# Patient Record
Sex: MALE | Race: WHITE | NOT HISPANIC OR LATINO | Employment: OTHER | URBAN - METROPOLITAN AREA
[De-identification: names, ages, dates, MRNs, and addresses within clinical notes are randomized per-mention and may not be internally consistent; named-entity substitution may affect disease eponyms.]

---

## 2019-03-15 ENCOUNTER — HOSPITAL ENCOUNTER (OUTPATIENT)
Dept: OTHER | Facility: HOSPITAL | Age: 69
Setting detail: SPECIMEN
Discharge: HOME | End: 2019-03-15
Attending: INTERNAL MEDICINE | Admitting: INTERNAL MEDICINE

## 2019-07-01 ENCOUNTER — APPOINTMENT (OUTPATIENT)
Dept: ULTRASOUND IMAGING | Facility: HOSPITAL | Age: 69
End: 2019-07-01

## 2019-07-01 ENCOUNTER — APPOINTMENT (OUTPATIENT)
Dept: GENERAL RADIOLOGY | Facility: HOSPITAL | Age: 69
End: 2019-07-01

## 2019-07-01 ENCOUNTER — HOSPITAL ENCOUNTER (OUTPATIENT)
Facility: HOSPITAL | Age: 69
Setting detail: OBSERVATION
Discharge: HOME | End: 2019-07-01
Attending: INTERNAL MEDICINE | Admitting: INTERNAL MEDICINE

## 2019-07-01 VITALS
HEART RATE: 59 BPM | HEIGHT: 71 IN | WEIGHT: 301.59 LBS | SYSTOLIC BLOOD PRESSURE: 140 MMHG | OXYGEN SATURATION: 97 % | RESPIRATION RATE: 19 BRPM | TEMPERATURE: 98.2 F | BODY MASS INDEX: 42.22 KG/M2 | DIASTOLIC BLOOD PRESSURE: 91 MMHG

## 2019-07-01 DIAGNOSIS — R10.11 RIGHT UPPER QUADRANT ABDOMINAL PAIN: ICD-10-CM

## 2019-07-01 DIAGNOSIS — R07.9 CHEST PAIN, UNSPECIFIED TYPE: Primary | ICD-10-CM

## 2019-07-01 DIAGNOSIS — R06.00 DYSPNEA, UNSPECIFIED TYPE: ICD-10-CM

## 2019-07-01 LAB
ALBUMIN SERPL-MCNC: 3.7 G/DL (ref 3.5–4.8)
ALBUMIN/GLOB SERPL: 1.4 G/DL (ref 1–1.7)
ALP SERPL-CCNC: 51 U/L (ref 32–91)
ALT SERPL W P-5'-P-CCNC: 17 U/L (ref 17–63)
ANION GAP SERPL CALCULATED.3IONS-SCNC: 14.4 MMOL/L (ref 10–20)
APTT PPP: 26 SECONDS (ref 24–31)
AST SERPL-CCNC: 15 U/L (ref 15–41)
BASOPHILS # BLD AUTO: 0 10*3/MM3 (ref 0–0.2)
BASOPHILS NFR BLD AUTO: 0.7 % (ref 0–1.5)
BILIRUB SERPL-MCNC: 0.6 MG/DL (ref 0.3–1.2)
BNP SERPL-MCNC: 78 PG/ML
BUN BLD-MCNC: 17 MG/DL (ref 8–20)
BUN/CREAT SERPL: 17 (ref 6.2–20.3)
CALCIUM SPEC-SCNC: 8.9 MG/DL (ref 8.9–10.3)
CHLORIDE SERPL-SCNC: 105 MMOL/L (ref 101–111)
CO2 SERPL-SCNC: 21 MMOL/L (ref 22–32)
CREAT BLD-MCNC: 1 MG/DL (ref 0.7–1.2)
D DIMER PPP FEU-MCNC: 0.39 MCGFEU/ML (ref 0.17–0.59)
DEPRECATED RDW RBC AUTO: 48.6 FL (ref 37–54)
EOSINOPHIL # BLD AUTO: 0.1 10*3/MM3 (ref 0–0.4)
EOSINOPHIL NFR BLD AUTO: 1 % (ref 0.3–6.2)
ERYTHROCYTE [DISTWIDTH] IN BLOOD BY AUTOMATED COUNT: 14 % (ref 12.3–15.4)
GFR SERPL CREATININE-BSD FRML MDRD: 74 ML/MIN/1.73
GLOBULIN UR ELPH-MCNC: 2.6 GM/DL (ref 2.5–3.8)
GLUCOSE BLD-MCNC: 157 MG/DL (ref 65–99)
GLUCOSE BLDC GLUCOMTR-MCNC: 132 MG/DL (ref 70–105)
HBA1C MFR BLD: 6.2 % (ref 3.5–5.6)
HCT VFR BLD AUTO: 44.3 % (ref 37.5–51)
HGB BLD-MCNC: 14.6 G/DL (ref 13–17.7)
INR PPP: 1 (ref 0.9–1.1)
LIPASE SERPL-CCNC: 41 U/L (ref 22–51)
LYMPHOCYTES # BLD AUTO: 1.2 10*3/MM3 (ref 0.7–3.1)
LYMPHOCYTES NFR BLD AUTO: 16.7 % (ref 19.6–45.3)
MCH RBC QN AUTO: 32.7 PG (ref 26.6–33)
MCHC RBC AUTO-ENTMCNC: 33 G/DL (ref 31.5–35.7)
MCV RBC AUTO: 99.2 FL (ref 79–97)
MONOCYTES # BLD AUTO: 0.6 10*3/MM3 (ref 0.1–0.9)
MONOCYTES NFR BLD AUTO: 8.1 % (ref 5–12)
NEUTROPHILS # BLD AUTO: 5.3 10*3/MM3 (ref 1.7–7)
NEUTROPHILS NFR BLD AUTO: 73.5 % (ref 42.7–76)
NRBC BLD AUTO-RTO: 0.1 /100 WBC (ref 0–0.2)
PLATELET # BLD AUTO: 234 10*3/MM3 (ref 140–450)
PMV BLD AUTO: 8.6 FL (ref 6–12)
POTASSIUM BLD-SCNC: 3.4 MMOL/L (ref 3.6–5.1)
PROT SERPL-MCNC: 6.3 G/DL (ref 6.1–7.9)
PROTHROMBIN TIME: 10.3 SECONDS (ref 9.6–11.7)
RBC # BLD AUTO: 4.47 10*6/MM3 (ref 4.14–5.8)
SODIUM BLD-SCNC: 137 MMOL/L (ref 136–144)
TROPONIN I SERPL-MCNC: <0.03 NG/ML (ref 0–0.03)
TROPONIN I SERPL-MCNC: <0.03 NG/ML (ref 0–0.03)
WBC NRBC COR # BLD: 7.2 10*3/MM3 (ref 3.4–10.8)

## 2019-07-01 PROCEDURE — 83036 HEMOGLOBIN GLYCOSYLATED A1C: CPT | Performed by: NURSE PRACTITIONER

## 2019-07-01 PROCEDURE — 94799 UNLISTED PULMONARY SVC/PX: CPT

## 2019-07-01 PROCEDURE — 83690 ASSAY OF LIPASE: CPT | Performed by: NURSE PRACTITIONER

## 2019-07-01 PROCEDURE — 99235 HOSP IP/OBS SAME DATE MOD 70: CPT | Performed by: INTERNAL MEDICINE

## 2019-07-01 PROCEDURE — 85610 PROTHROMBIN TIME: CPT | Performed by: NURSE PRACTITIONER

## 2019-07-01 PROCEDURE — 76705 ECHO EXAM OF ABDOMEN: CPT

## 2019-07-01 PROCEDURE — 83880 ASSAY OF NATRIURETIC PEPTIDE: CPT | Performed by: NURSE PRACTITIONER

## 2019-07-01 PROCEDURE — 84484 ASSAY OF TROPONIN QUANT: CPT | Performed by: NURSE PRACTITIONER

## 2019-07-01 PROCEDURE — 82962 GLUCOSE BLOOD TEST: CPT

## 2019-07-01 PROCEDURE — 85025 COMPLETE CBC W/AUTO DIFF WBC: CPT | Performed by: NURSE PRACTITIONER

## 2019-07-01 PROCEDURE — G0378 HOSPITAL OBSERVATION PER HR: HCPCS

## 2019-07-01 PROCEDURE — 71045 X-RAY EXAM CHEST 1 VIEW: CPT

## 2019-07-01 PROCEDURE — 99284 EMERGENCY DEPT VISIT MOD MDM: CPT

## 2019-07-01 PROCEDURE — 85730 THROMBOPLASTIN TIME PARTIAL: CPT | Performed by: NURSE PRACTITIONER

## 2019-07-01 PROCEDURE — 80053 COMPREHEN METABOLIC PANEL: CPT | Performed by: NURSE PRACTITIONER

## 2019-07-01 PROCEDURE — 85379 FIBRIN DEGRADATION QUANT: CPT | Performed by: NURSE PRACTITIONER

## 2019-07-01 PROCEDURE — 93005 ELECTROCARDIOGRAM TRACING: CPT | Performed by: NURSE PRACTITIONER

## 2019-07-01 RX ORDER — ONDANSETRON 4 MG/1
4 TABLET, FILM COATED ORAL EVERY 6 HOURS PRN
Status: DISCONTINUED | OUTPATIENT
Start: 2019-07-01 | End: 2019-07-01 | Stop reason: HOSPADM

## 2019-07-01 RX ORDER — SODIUM CHLORIDE 0.9 % (FLUSH) 0.9 %
3 SYRINGE (ML) INJECTION EVERY 12 HOURS SCHEDULED
Status: DISCONTINUED | OUTPATIENT
Start: 2019-07-01 | End: 2019-07-01 | Stop reason: HOSPADM

## 2019-07-01 RX ORDER — ATENOLOL 50 MG/1
50 TABLET ORAL DAILY
Status: DISCONTINUED | OUTPATIENT
Start: 2019-07-01 | End: 2019-07-01 | Stop reason: HOSPADM

## 2019-07-01 RX ORDER — POTASSIUM CHLORIDE 20 MEQ/1
10 TABLET, EXTENDED RELEASE ORAL ONCE
Status: COMPLETED | OUTPATIENT
Start: 2019-07-01 | End: 2019-07-01

## 2019-07-01 RX ORDER — LIDOCAINE 50 MG/G
1 PATCH TOPICAL EVERY 24 HOURS
Qty: 6 EACH | Refills: 0 | Status: SHIPPED | OUTPATIENT
Start: 2019-07-01

## 2019-07-01 RX ORDER — SODIUM CHLORIDE 0.9 % (FLUSH) 0.9 %
10 SYRINGE (ML) INJECTION AS NEEDED
Status: DISCONTINUED | OUTPATIENT
Start: 2019-07-01 | End: 2019-07-01 | Stop reason: HOSPADM

## 2019-07-01 RX ORDER — ALLOPURINOL 300 MG/1
300 TABLET ORAL DAILY
Status: DISCONTINUED | OUTPATIENT
Start: 2019-07-01 | End: 2019-07-01 | Stop reason: HOSPADM

## 2019-07-01 RX ORDER — LISINOPRIL 20 MG/1
20 TABLET ORAL DAILY
COMMUNITY

## 2019-07-01 RX ORDER — DEXTROSE MONOHYDRATE 25 G/50ML
25 INJECTION, SOLUTION INTRAVENOUS
Status: DISCONTINUED | OUTPATIENT
Start: 2019-07-01 | End: 2019-07-01 | Stop reason: HOSPADM

## 2019-07-01 RX ORDER — ALLOPURINOL 300 MG/1
300 TABLET ORAL DAILY
COMMUNITY

## 2019-07-01 RX ORDER — ALUMINA, MAGNESIA, AND SIMETHICONE 2400; 2400; 240 MG/30ML; MG/30ML; MG/30ML
15 SUSPENSION ORAL ONCE
Status: COMPLETED | OUTPATIENT
Start: 2019-07-01 | End: 2019-07-01

## 2019-07-01 RX ORDER — ONDANSETRON 2 MG/ML
4 INJECTION INTRAMUSCULAR; INTRAVENOUS EVERY 6 HOURS PRN
Status: DISCONTINUED | OUTPATIENT
Start: 2019-07-01 | End: 2019-07-01 | Stop reason: HOSPADM

## 2019-07-01 RX ORDER — NITROGLYCERIN 0.4 MG/1
0.4 TABLET SUBLINGUAL
Status: DISCONTINUED | OUTPATIENT
Start: 2019-07-01 | End: 2019-07-01 | Stop reason: HOSPADM

## 2019-07-01 RX ORDER — SIMVASTATIN 80 MG
80 TABLET ORAL NIGHTLY
COMMUNITY

## 2019-07-01 RX ORDER — LISINOPRIL 20 MG/1
20 TABLET ORAL DAILY
Status: DISCONTINUED | OUTPATIENT
Start: 2019-07-01 | End: 2019-07-01 | Stop reason: HOSPADM

## 2019-07-01 RX ORDER — INDOMETHACIN 50 MG/1
50 CAPSULE ORAL 3 TIMES DAILY PRN
COMMUNITY
End: 2020-01-21

## 2019-07-01 RX ORDER — ATENOLOL 50 MG/1
50 TABLET ORAL DAILY
COMMUNITY

## 2019-07-01 RX ORDER — SODIUM CHLORIDE 0.9 % (FLUSH) 0.9 %
3-10 SYRINGE (ML) INJECTION AS NEEDED
Status: DISCONTINUED | OUTPATIENT
Start: 2019-07-01 | End: 2019-07-01 | Stop reason: HOSPADM

## 2019-07-01 RX ORDER — PIOGLITAZONEHYDROCHLORIDE 45 MG/1
45 TABLET ORAL DAILY
Status: DISCONTINUED | OUTPATIENT
Start: 2019-07-01 | End: 2019-07-01 | Stop reason: HOSPADM

## 2019-07-01 RX ORDER — ACETAMINOPHEN 325 MG/1
650 TABLET ORAL EVERY 4 HOURS PRN
Status: DISCONTINUED | OUTPATIENT
Start: 2019-07-01 | End: 2019-07-01 | Stop reason: HOSPADM

## 2019-07-01 RX ORDER — NICOTINE POLACRILEX 4 MG
15 LOZENGE BUCCAL
Status: DISCONTINUED | OUTPATIENT
Start: 2019-07-01 | End: 2019-07-01 | Stop reason: HOSPADM

## 2019-07-01 RX ORDER — INDOMETHACIN 25 MG/1
50 CAPSULE ORAL 3 TIMES DAILY PRN
Status: DISCONTINUED | OUTPATIENT
Start: 2019-07-01 | End: 2019-07-01 | Stop reason: HOSPADM

## 2019-07-01 RX ORDER — PIOGLITAZONEHYDROCHLORIDE 45 MG/1
45 TABLET ORAL DAILY
COMMUNITY

## 2019-07-01 RX ORDER — ATORVASTATIN CALCIUM 40 MG/1
40 TABLET, FILM COATED ORAL DAILY
Status: DISCONTINUED | OUTPATIENT
Start: 2019-07-01 | End: 2019-07-01 | Stop reason: HOSPADM

## 2019-07-01 RX ADMIN — LIDOCAINE HYDROCHLORIDE 15 ML: 20 SOLUTION ORAL; TOPICAL at 05:02

## 2019-07-01 RX ADMIN — Medication 3 ML: at 09:47

## 2019-07-01 RX ADMIN — POTASSIUM CHLORIDE 10 MEQ: 20 TABLET, EXTENDED RELEASE ORAL at 05:40

## 2019-07-01 RX ADMIN — ACETAMINOPHEN 650 MG: 325 TABLET, FILM COATED ORAL at 12:14

## 2019-07-01 RX ADMIN — ATENOLOL 50 MG: 50 TABLET ORAL at 09:46

## 2019-07-01 RX ADMIN — ALUMINUM HYDROXIDE, MAGNESIUM HYDROXIDE, AND DIMETHICONE 15 ML: 400; 400; 40 SUSPENSION ORAL at 05:02

## 2019-07-01 NOTE — H&P
[facility] HOSPITALIST     Jj Ballard MD    CHIEF COMPLAINT:     Chest pain    HISTORY OF PRESENT ILLNESS:    69 yo male presented to the emergency room after having right upper quadrant/right shoulder blade pain that started around 1 AM last night.  But that the pain woke him from sleep and was more of a discomfort.  The wife's account when she would massage the area of discomfort it would get better.  However the pain did not subside so he decided to come to the emergency room for further evaluation.  In the ER and EKG was done troponins were checked and then admission was requested for chest pain.  Reports no diaphoresis nausea vomiting associated with the episode.  He is also had no increased shortness of breath is exertion.  No fever chills.      Past Medical History:   Diagnosis Date   • Diabetes mellitus (CMS/HCC)    • Gallstones    • Gout    • Hyperlipidemia    • Hypertension    • Kidney stone      Past Surgical History:   Procedure Laterality Date   • HERNIA REPAIR     • KIDNEY STONE SURGERY       History reviewed. No pertinent family history.  Social History     Tobacco Use   • Smoking status: Never Smoker   • Smokeless tobacco: Never Used   Substance Use Topics   • Alcohol use: Yes     Alcohol/week: 0.6 oz     Types: 1 Shots of liquor per week   • Drug use: No     Medications Prior to Admission   Medication Sig Dispense Refill Last Dose   • allopurinol (ZYLOPRIM) 300 MG tablet Take 300 mg by mouth Daily.      • atenolol (TENORMIN) 50 MG tablet Take 50 mg by mouth Daily.      • indomethacin (INDOCIN) 50 MG capsule Take 50 mg by mouth 3 (Three) Times a Day As Needed for Mild Pain .      • lisinopril (PRINIVIL,ZESTRIL) 20 MG tablet Take 20 mg by mouth Daily.      • metFORMIN (GLUCOPHAGE) 1000 MG tablet Take 1,000 mg by mouth 2 (Two) Times a Day With Meals.      • pioglitazone (ACTOS) 45 MG tablet Take 45 mg by mouth Daily.      • simvastatin (ZOCOR) 80 MG tablet Take 80 mg by  "mouth Every Night.        Allergies:  Patient has no known allergies.      There is no immunization history on file for this patient.        REVIEW OF SYSTEMS:  Please see the above history of present illness for pertinent positives and negatives.  The remainder of the patient's systems have been reviewed and are negative.     Vital Signs  Temp:  [97.9 °F (36.6 °C)-98.1 °F (36.7 °C)] 97.9 °F (36.6 °C)  Heart Rate:  [64-76] 64  Resp:  [14-19] 18  BP: (112-151)/(66-82) 127/76    Flowsheet Rows      First Filed Value   Admission Height  177.8 cm (70\") Documented at 07/01/2019 0343   Admission Weight  138 kg (305 lb 5.4 oz)  (Abnormal)  Documented at 07/01/2019 0343           Physical Exam:  Gen: NAD  HEENT: EOMI, no icterus, PERRL  Neck: No JVD  Heart: RRR, no murmur  Lung: CTA b/l, adequate air movement  ABD: soft, NT, ND, no rebound or guarding  MSK: moves ext spontaneously  Neuro: AO x 3, CN II-XII intact  Psych: no anxiety, no depression  Skin: warm, dry, intact  Extremities:  No edema    Results Review:    I reviewed the patient's new clinical results.  Lab Results (most recent)     Procedure Component Value Units Date/Time    Hemoglobin A1c [[id number]]  (Abnormal) Collected:  07/01/19 0630    Specimen:  Blood Updated:  07/01/19 0828     Hemoglobin A1C 6.2 %     Narrative:       Hemoglobin A1C Reference Range:    <5.7 %        Normal  5.7-6.4 %     Increased risk for diabetes  > 6.4 %        Diabetes       These guidelines have been recommended by the American Diabetic Association for Hgb A1c.      The following 2010 guidelines have been recommended by the American Diabetes Association for Hemoglobin A1c.    HBA1c 5.7-6.4% Increased risk for future diabetes (pre-diabetes)  HBA1c     >6.4% Diabetes    Troponin [[id number]]  (Normal) Collected:  07/01/19 0630    Specimen:  Blood Updated:  07/01/19 0736     Troponin I <0.030 ng/mL     Narrative:       Troponin I Reference Range:    0.00-0.03  Negative.  Repeat testing " in 4-6 hours if clinically indicated.    0.04-0.29  Suspicious for myocardial injury. Serial measurements and clinical  correlation may be necessary to confirm or exclude diagnosis of acute  coronary syndrome.  Repeat testing in 4-6 hours if indicated.     >0.29 Consistent with myocardial injury.  Recommend clinical and laboratory correlation.     Results my be falsely decreased if patient taking Biotin.     POC Glucose Once [[id number]]  (Abnormal) Collected:  07/01/19 0711    Specimen:  Blood Updated:  07/01/19 0712     Glucose 132 mg/dL      Comment: Serial Number: [id number]:  [id number]       Troponin [[id number]]  (Normal) Collected:  07/01/19 0407    Specimen:  Blood Updated:  07/01/19 0447     Troponin I <0.030 ng/mL     Narrative:       Troponin I Reference Range:    0.00-0.03  Negative.  Repeat testing in 4-6 hours if clinically indicated.    0.04-0.29  Suspicious for myocardial injury. Serial measurements and clinical  correlation may be necessary to confirm or exclude diagnosis of acute  coronary syndrome.  Repeat testing in 4-6 hours if indicated.     >0.29 Consistent with myocardial injury.  Recommend clinical and laboratory correlation.     Results my be falsely decreased if patient taking Biotin.     Comprehensive Metabolic Panel [[id number]]  (Abnormal) Collected:  07/01/19 0407    Specimen:  Blood Updated:  07/01/19 0446     Glucose 157 mg/dL      BUN 17 mg/dL      Creatinine 1.00 mg/dL      Sodium 137 mmol/L      Potassium 3.4 mmol/L      Chloride 105 mmol/L      CO2 21.0 mmol/L      Calcium 8.9 mg/dL      Total Protein 6.3 g/dL      Albumin 3.70 g/dL      ALT (SGPT) 17 U/L      AST (SGOT) 15 U/L      Alkaline Phosphatase 51 U/L      Total Bilirubin 0.6 mg/dL      eGFR Non African Amer 74 mL/min/1.73      Globulin 2.6 gm/dL      A/G Ratio 1.4 g/dL      BUN/Creatinine Ratio 17.0     Anion Gap 14.4 mmol/L     Lipase [[id number]]  (Normal) Collected:  07/01/19 0407    Specimen:  Blood Updated:   07/01/19 0446     Lipase 41 U/L     Protime-INR [[id number]]  (Normal) Collected:  07/01/19 0407    Specimen:  Blood Updated:  07/01/19 0443     Protime 10.3 Seconds      INR 1.00    aPTT [[id number]]  (Normal) Collected:  07/01/19 0407    Specimen:  Blood Updated:  07/01/19 0443     PTT 26.0 seconds     D-dimer, Quantitative [[id number]]  (Normal) Collected:  07/01/19 0407    Specimen:  Blood Updated:  07/01/19 0443     D-Dimer, Quantitative 0.39 MCGFEU/mL     Narrative:       Reference Range  --------------------------------------------------------------------     < 0.50   Negative Predictive Value  0.50-0.59   Indeterminate    >= 0.60   Probable VTE             A very low percentage of patients with DVT may yield D-Dimer results   below the cut-off of 0.50 MCGFEU/mL.  This is known to be more   prevalent in patients with distal DVT.             Results of this test should always be interpreted in conjunction with   the patient's medical history, clinical presentation and other   findings.  Clinical diagnosis should not be based on the result of   IN[place]OVANCE D-Dimer alone.    BNP [[id number]]  (Normal) Collected:  07/01/19 0407    Specimen:  Blood Updated:  07/01/19 0443     BNP 78.0 pg/mL      Comment: Results may be falsely decreased if patient taking Biotin.       CBC & Differential [[id number]] Collected:  07/01/19 0407    Specimen:  Blood Updated:  07/01/19 0432    Narrative:       The following orders were created for panel order CBC & Differential.  Procedure                               Abnormality         Status                     ---------                               -----------         ------                     CBC Auto Differential[[id number]]        Abnormal            Final result                 Please view results for these tests on the individual orders.    CBC Auto Differential [[id number]]  (Abnormal) Collected:  07/01/19 0407    Specimen:  Blood Updated:  07/01/19 0432     WBC 7.20 10*3/mm3       RBC 4.47 10*6/mm3      Hemoglobin 14.6 g/dL      Hematocrit 44.3 %      MCV 99.2 fL      MCH 32.7 pg      MCHC 33.0 g/dL      RDW 14.0 %      RDW-SD 48.6 fl      MPV 8.6 fL      Platelets 234 10*3/mm3      Neutrophil % 73.5 %      Lymphocyte % 16.7 %      Monocyte % 8.1 %      Eosinophil % 1.0 %      Basophil % 0.7 %      Neutrophils, Absolute 5.30 10*3/mm3      Lymphocytes, Absolute 1.20 10*3/mm3      Monocytes, Absolute 0.60 10*3/mm3      Eosinophils, Absolute 0.10 10*3/mm3      Basophils, Absolute 0.00 10*3/mm3      nRBC 0.1 /100 WBC           Imaging Results (most recent)     Procedure Component Value Units Date/Time    XR Chest 1 View [[id number]] Resulted:  07/01/19 0515     Updated:  07/01/19 0515        reviewed    ECG/EMG Results (most recent)     Procedure Component Value Units Date/Time    ECG 12 Lead [[id number]] Collected:  07/01/19 0332     Updated:  07/01/19 0420    Narrative:       HEART RATE= 73  bpm  RR Interval= 824  ms  AR Interval= 156  ms  P Horizontal Axis= -24  deg  P Front Axis= 20  deg  QRSD Interval= 98  ms  QT Interval= 407  ms  QRS Axis= -67  deg  T Wave Axis= 3  deg  - ABNORMAL ECG -  Sinus rhythm  Ventricular premature complex  Probable left atrial enlargement  Inferior infarct, old  Consider anterior infarct  Electronically Signed By:   Date and Time of Study: 2019-07-01 03:32:39        reviewed    Assessment/Plan     Chest pain       Atypical right upper quadrant chest pain   -poss GI related vs MSK  -troponins negative ACS ruled out  -EKG as above  -check RUQ US    Type II DM  -chronic  -SSI while here     HTN  -chronic  -c/w home meds     DVT-low risk early ambulation        D/C Summary    Discharge Diagnosis:  Atypical right upper quadrant chest pain   -likely MSK vs possible gallbladder disease   -troponins negative ACS ruled out  -EKG as above  -RUQ US: gallstones and mild wall thickening possible chronic cholecystitis but no acute cholecystitis noted  -outpt General Surgery  follow up     Hospital Course  Patient is a 68 y.o. male presented with atypical right upper quadrant abdominal pain/shoulder pain.  he had troponins checked and all negative thus ACS was ruled out.  Pain was improved with massaging of the area likely relating to musculoskeletal type pain.  Additionally he had a right upper quadrant ultrasound that showed gallstones and some chronic mild wall thickening of the gallbladder.  It was discussed with the patient and he preferred to have general surgery follow-up as an outpatient.  His pain improved and he was stable and discharged home outpatient follow-up.    Past Medical History:     Past Medical History:   Diagnosis Date   • Diabetes mellitus (CMS/HCC)    • Gallstones    • Gout    • Hyperlipidemia    • Hypertension    • Kidney stone        Past Surgical History:     Past Surgical History:   Procedure Laterality Date   • HERNIA REPAIR     • KIDNEY STONE SURGERY         Social History:   Social History     Socioeconomic History   • Marital status:      Spouse name: Not on file   • Number of children: Not on file   • Years of education: Not on file   • Highest education level: Not on file   Tobacco Use   • Smoking status: Never Smoker   • Smokeless tobacco: Never Used   Substance and Sexual Activity   • Alcohol use: Yes     Alcohol/week: 0.6 oz     Types: 1 Shots of liquor per week   • Drug use: No   • Sexual activity: No       Procedures Performed         Consults:   Consults     Date and Time Order Name Status Description    7/1/2019 0515 Hospitalist (on-call MD unless specified) Completed           Condition on Discharge: stable    Discharge Disposition      Discharge Medications     Discharge Medications      ASK your doctor about these medications      Instructions Start Date   allopurinol 300 MG tablet  Commonly known as:  ZYLOPRIM   300 mg, Oral, Daily      atenolol 50 MG tablet  Commonly known as:  TENORMIN   50 mg, Oral, Daily      indomethacin 50 MG  capsule  Commonly known as:  INDOCIN   50 mg, Oral, 3 Times Daily PRN      lisinopril 20 MG tablet  Commonly known as:  PRINIVIL,ZESTRIL   20 mg, Oral, Daily      metFORMIN 1000 MG tablet  Commonly known as:  GLUCOPHAGE   1,000 mg, Oral, 2 Times Daily With Meals      pioglitazone 45 MG tablet  Commonly known as:  ACTOS   45 mg, Oral, Daily      simvastatin 80 MG tablet  Commonly known as:  ZOCOR   80 mg, Oral, Nightly             Discharge Diet: Low-fat low residue    Activity at Discharge: Tolerated    Follow-up Appointments  No future appointments.  [unfilled]    Test Results Pending at Discharge  [unfilled]     Risk for Readmission (LACE) Score: 1 (7/1/2019  6:28 AM)          George Fishman DO  07/01/19  8:40 AM

## 2019-07-01 NOTE — ED PROVIDER NOTES
Subjective   Context: 68-year-old male patient presents the ER with complaint of being awakened with pain to the right upper quadrant and right chest; patient reports that he did not have any shortness of breath with this pain, he states that it radiates underneath his right shoulder.  He reports that is diagnosed with gallstones in the past and reports that he was instructed to watch out for further symptoms.  He denies anginal equivalent chest pain tachycardia irregular heartbeat, reports no fainting or near fainting episode.  He reports that he has chronic edema to his legs, left greater than right, he reports that he had multiple evaluations for DVTs that have all been negative.  He reports that recently for the past week he has developed some tenderness behind his right knee, he reports he has noticed no increased edema.  Denies activity intolerance.  The patient reports he has had no appetite or food intolerance but states that he has noticed the symptoms increased recently, he states that he is in a vehicle a lot for work.      Location:   RUQ R chest  Quality: Pressure dull  Timin  Duration: Waxes and wanes  Aggravating: Unable to state  Alleviating: Continuously improved    PCP:  Elio            Review of Systems   Constitutional: Negative for activity change, appetite change, chills, diaphoresis and fever.   HENT: Negative for congestion.    Eyes: Negative for discharge.   Respiratory: Negative for cough, choking, chest tightness and shortness of breath.    Cardiovascular: Positive for leg swelling. Negative for chest pain and palpitations.        R side CP    Chronic leg swelling   Gastrointestinal: Positive for abdominal pain and constipation. Negative for diarrhea, nausea and vomiting.        RUQ   Genitourinary: Negative for dysuria.   Musculoskeletal: Negative for back pain, joint swelling and myalgias.   Skin: Negative for color change, pallor, rash and wound.   Neurological: Negative for  dizziness, light-headedness, numbness and headaches.   Hematological: Negative for adenopathy. Does not bruise/bleed easily.     Prior to Admission medications    Medication Sig Start Date End Date Taking? Authorizing Provider   allopurinol (ZYLOPRIM) 300 MG tablet Take 300 mg by mouth Daily.   Yes Angelica Frye MD   atenolol (TENORMIN) 50 MG tablet Take 50 mg by mouth Daily.   Yes Angelica Frye MD   indomethacin (INDOCIN) 50 MG capsule Take 50 mg by mouth 3 (Three) Times a Day As Needed for Mild Pain .   Yes Angelica Frye MD   lisinopril (PRINIVIL,ZESTRIL) 20 MG tablet Take 20 mg by mouth Daily.   Yes Angelica Frye MD   metFORMIN (GLUCOPHAGE) 1000 MG tablet Take 1,000 mg by mouth 2 (Two) Times a Day With Meals.   Yes Angelica Frye MD   pioglitazone (ACTOS) 45 MG tablet Take 45 mg by mouth Daily.   Yes Angelica Frye MD   simvastatin (ZOCOR) 80 MG tablet Take 80 mg by mouth Every Night.   Yes Angelica Frye MD         Past Medical History:   Diagnosis Date   • Diabetes mellitus (CMS/HCC)    • Gallstones    • Gout    • Hyperlipidemia    • Hypertension    • Kidney stone        No Known Allergies    Past Surgical History:   Procedure Laterality Date   • HERNIA REPAIR     • KIDNEY STONE SURGERY         History reviewed. No pertinent family history.    Social History     Socioeconomic History   • Marital status:      Spouse name: Not on file   • Number of children: Not on file   • Years of education: Not on file   • Highest education level: Not on file   Tobacco Use   • Smoking status: Never Smoker   Substance and Sexual Activity   • Alcohol use: Yes     Alcohol/week: 0.6 oz     Types: 1 Shots of liquor per week           Objective   Physical Exam       Vital signs and triage nurse note reviewed.   Constitutional: Awake, alert; but healthy-appearing 68-year-old male who is well-developed and well-nourished. No acute distress is noted.  Pleasant conversational  examination, appearance is nontoxic   HEENT: Normocephalic, atraumatic; pupils are PERRL with intact EOM; oropharynx is pink and moist without exudate or erythema.   Neck: Supple, full range of motion without pain; no cervical lymphadenopathy.  No erythema or induration of soft tissues of the neck   Cardiovascular: Regular rate and rhythm, normal S1-S2.  Pulses symmetrical extremities   Pulmonary: Respiratory effort regular nonlabored, reproducible tenderness with palpation of the chest wall, breath sounds clear to auscultation all fields.   Abdomen: Soft, obese, minimal tenderness is noted with palpation of the right upper quadrant, no organomegaly, nondistended with normoactive bowel sounds; no rebound or guarding.   Musculoskeletal: Independent range of motion of all extremities with no palpable tenderness or edema.   Neuro: Alert oriented x3, speech is clear and appropriate, GCS 15   Skin:  Fleshtone warm, dry, intact; no erythematous or petechial rash or lesion  Procedures         ECG 12 Lead   Preliminary Result   HEART RATE= 73  bpm   RR Interval= 824  ms   AL Interval= 156  ms   P Horizontal Axis= -24  deg   P Front Axis= 20  deg   QRSD Interval= 98  ms   QT Interval= 407  ms   QRS Axis= -67  deg   T Wave Axis= 3  deg   - ABNORMAL ECG -   Sinus rhythm   Ventricular premature complex   Probable left atrial enlargement   Inferior infarct, old   Consider anterior infarct   Electronically Signed By:    Date and Time of Study: 2019-07-01 03:32:39        Viewed by me, viewed and interpreted by Dr Fishman: No acute ST segment elevation, as above    ED Course        No radiology results for the last day  Results for orders placed or performed during the hospital encounter of 07/01/19   Comprehensive Metabolic Panel   Result Value Ref Range    Glucose 157 (H) 65 - 99 mg/dL    BUN 17 8 - 20 mg/dL    Creatinine 1.00 0.70 - 1.20 mg/dL    Sodium 137 136 - 144 mmol/L    Potassium 3.4 (L) 3.6 - 5.1 mmol/L    Chloride 105  101 - 111 mmol/L    CO2 21.0 (L) 22.0 - 32.0 mmol/L    Calcium 8.9 8.9 - 10.3 mg/dL    Total Protein 6.3 6.1 - 7.9 g/dL    Albumin 3.70 3.50 - 4.80 g/dL    ALT (SGPT) 17 17 - 63 U/L    AST (SGOT) 15 15 - 41 U/L    Alkaline Phosphatase 51 32 - 91 U/L    Total Bilirubin 0.6 0.3 - 1.2 mg/dL    eGFR Non African Amer 74 >60 mL/min/1.73    Globulin 2.6 2.5 - 3.8 gm/dL    A/G Ratio 1.4 1.0 - 1.7 g/dL    BUN/Creatinine Ratio 17.0 6.2 - 20.3    Anion Gap 14.4 10.0 - 20.0 mmol/L   Protime-INR   Result Value Ref Range    Protime 10.3 9.6 - 11.7 Seconds    INR 1.00 0.90 - 1.10   aPTT   Result Value Ref Range    PTT 26.0 24.0 - 31.0 seconds   Lipase   Result Value Ref Range    Lipase 41 22 - 51 U/L   D-dimer, Quantitative   Result Value Ref Range    D-Dimer, Quantitative 0.39 0.17 - 0.59 MCGFEU/mL   BNP   Result Value Ref Range    BNP 78.0 <=100.0 pg/mL   Troponin   Result Value Ref Range    Troponin I <0.030 0.000 - 0.030 ng/mL   CBC Auto Differential   Result Value Ref Range    WBC 7.20 3.40 - 10.80 10*3/mm3    RBC 4.47 4.14 - 5.80 10*6/mm3    Hemoglobin 14.6 13.0 - 17.7 g/dL    Hematocrit 44.3 37.5 - 51.0 %    MCV 99.2 (H) 79.0 - 97.0 fL    MCH 32.7 26.6 - 33.0 pg    MCHC 33.0 31.5 - 35.7 g/dL    RDW 14.0 12.3 - 15.4 %    RDW-SD 48.6 37.0 - 54.0 fl    MPV 8.6 6.0 - 12.0 fL    Platelets 234 140 - 450 10*3/mm3    Neutrophil % 73.5 42.7 - 76.0 %    Lymphocyte % 16.7 (L) 19.6 - 45.3 %    Monocyte % 8.1 5.0 - 12.0 %    Eosinophil % 1.0 0.3 - 6.2 %    Basophil % 0.7 0.0 - 1.5 %    Neutrophils, Absolute 5.30 1.70 - 7.00 10*3/mm3    Lymphocytes, Absolute 1.20 0.70 - 3.10 10*3/mm3    Monocytes, Absolute 0.60 0.10 - 0.90 10*3/mm3    Eosinophils, Absolute 0.10 0.00 - 0.40 10*3/mm3    Basophils, Absolute 0.00 0.00 - 0.20 10*3/mm3    nRBC 0.1 0.0 - 0.2 /100 WBC     Medications   sodium chloride 0.9 % flush 10 mL (not administered)   potassium chloride (K-DUR,KLOR-CON) CR tablet 10 mEq (not administered)   aluminum-magnesium  "hydroxide-simethicone (MAALOX MAX) 400-400-40 MG/5ML suspension 15 mL (15 mL Oral Given 7/1/19 0502)   lidocaine viscous (XYLOCAINE) 2 % mouth solution 15 mL (15 mL Mouth/Throat Given 7/1/19 0502)     /66 (BP Location: Left arm, Patient Position: Lying)   Pulse 76   Temp 98.1 °F (36.7 °C) (Oral)   Resp 19   Ht 177.8 cm (70\")   Wt (!) 138 kg (305 lb 5.4 oz)   SpO2 98%   BMI 43.81 kg/m²           MDM    Comorbidities:   HTN T2DM    Previous records reviewed: No pertinent to this visit for review    Review and summarization of lab specimens, radiology: Chest x-rays labs specimens reviewed by me; x-ray interpreted by Dr. Fishman    Consultation: Hospitalist for admission: Alba ISAACS for Dr Flaherty    Differentials: MI, NSTEMI, ACS, CHF, pulmonary embolism, DVT, pneumonia bronchitis cholecystitis cholangitis hepatitis pancreatitis ileus; this list is not all inclusive and does not constitute the entirety of considered causes      Examination the patient reports that he has no increase in discomfort; differential diagnosis reviewed, at this time recommendations made for admission to the hospital for further evaluation of his symptoms with serial EKGs, troponins and further evaluation of his right upper quadrant abdominal discomfort, the patient was understanding and agrees.  He was given K-Dur in the ED for slight decrease in serum potassium.  Reports he has a history of thin blood, aspirin will be withheld until further evaluation.  He states that he had some improvement with GI cocktail.  Patient admitted in the hospital service in stable condition to 23 hr medical monitor bed for further evaluation and treatment    Final diagnoses:   Chest pain, unspecified type   Dyspnea, unspecified type   Right upper quadrant abdominal pain            Yesica Clemens NP  07/01/19 0533    "

## 2019-07-01 NOTE — PROGRESS NOTES
Case Management Discharge Note    Final Note: Routine d/c to home - Denies d/c needs              Final Discharge Disposition Code: 01 - home or self-care

## 2019-07-01 NOTE — PROGRESS NOTES
Discharge Planning Assessment  [facility]     Patient Name: Jr Mayer  MRN: [id number]  Today's Date: 7/1/2019    Admit Date: 7/1/2019    Discharge Needs Assessment     Row Name 07/01/19 1322       Living Environment    Lives With  spouse    Current Living Arrangements  home/apartment/condo    Primary Care Provided by  self    Able to Return to Prior Arrangements  yes       Resource/Environmental Concerns    Transportation Concerns  car, none       Transition Planning    Patient/Family Anticipates Transition to  home    Patient/Family Anticipated Services at Transition  none    Transportation Anticipated  family or friend will provide       Discharge Needs Assessment    Concerns to be Addressed  no discharge needs identified    Equipment Currently Used at Home  none    Anticipated Changes Related to Illness  none    Equipment Needed After Discharge  none        Discharge Plan     Row Name 07/01/19 1324       Plan    Final Discharge Disposition Code  01 - home or self-care    Final Note  Routine d/c to home - Denies d/c needs            Gianna Don RN

## 2019-07-02 ENCOUNTER — READMISSION MANAGEMENT (OUTPATIENT)
Dept: CALL CENTER | Facility: HOSPITAL | Age: 69
End: 2019-07-02

## 2019-07-02 NOTE — OUTREACH NOTE
Prep Survey      Responses   Facility patient discharged from?  [facility]   Is patient eligible?  No   What are the reasons patient is not eligible?  Other [9 hour stay  [facility]]   Does the patient have one of the following disease processes/diagnoses(primary or secondary)?  Other   Prep survey completed?  Yes          Milena Shen RN

## 2020-01-21 ENCOUNTER — APPOINTMENT (OUTPATIENT)
Dept: CARDIOLOGY | Facility: HOSPITAL | Age: 70
End: 2020-01-21

## 2020-01-21 ENCOUNTER — APPOINTMENT (OUTPATIENT)
Dept: GENERAL RADIOLOGY | Facility: HOSPITAL | Age: 70
End: 2020-01-21

## 2020-01-21 ENCOUNTER — HOSPITAL ENCOUNTER (EMERGENCY)
Facility: HOSPITAL | Age: 70
Discharge: HOME | End: 2020-01-21
Admitting: EMERGENCY MEDICINE

## 2020-01-21 VITALS
BODY MASS INDEX: 42.01 KG/M2 | SYSTOLIC BLOOD PRESSURE: 131 MMHG | DIASTOLIC BLOOD PRESSURE: 83 MMHG | HEIGHT: 71 IN | WEIGHT: 300.05 LBS | RESPIRATION RATE: 18 BRPM | OXYGEN SATURATION: 95 % | TEMPERATURE: 97.5 F | HEART RATE: 55 BPM

## 2020-01-21 DIAGNOSIS — M25.561 RIGHT KNEE PAIN, UNSPECIFIED CHRONICITY: Primary | ICD-10-CM

## 2020-01-21 LAB
BH CV LOWER VASCULAR LEFT COMMON FEMORAL AUGMENT: NORMAL
BH CV LOWER VASCULAR LEFT COMMON FEMORAL COMPETENT: NORMAL
BH CV LOWER VASCULAR LEFT COMMON FEMORAL COMPRESS: NORMAL
BH CV LOWER VASCULAR LEFT COMMON FEMORAL PHASIC: NORMAL
BH CV LOWER VASCULAR LEFT COMMON FEMORAL SPONT: NORMAL
BH CV LOWER VASCULAR RIGHT COMMON FEMORAL AUGMENT: NORMAL
BH CV LOWER VASCULAR RIGHT COMMON FEMORAL COMPETENT: NORMAL
BH CV LOWER VASCULAR RIGHT COMMON FEMORAL COMPRESS: NORMAL
BH CV LOWER VASCULAR RIGHT COMMON FEMORAL PHASIC: NORMAL
BH CV LOWER VASCULAR RIGHT COMMON FEMORAL SPONT: NORMAL
BH CV LOWER VASCULAR RIGHT DISTAL FEMORAL COMPRESS: NORMAL
BH CV LOWER VASCULAR RIGHT GASTRONEMIUS COMPRESS: NORMAL
BH CV LOWER VASCULAR RIGHT GREATER SAPH AK COMPRESS: NORMAL
BH CV LOWER VASCULAR RIGHT GREATER SAPH BK COMPRESS: NORMAL
BH CV LOWER VASCULAR RIGHT LESSER SAPH COMPRESS: NORMAL
BH CV LOWER VASCULAR RIGHT MID FEMORAL AUGMENT: NORMAL
BH CV LOWER VASCULAR RIGHT MID FEMORAL COMPETENT: NORMAL
BH CV LOWER VASCULAR RIGHT MID FEMORAL COMPRESS: NORMAL
BH CV LOWER VASCULAR RIGHT MID FEMORAL PHASIC: NORMAL
BH CV LOWER VASCULAR RIGHT MID FEMORAL SPONT: NORMAL
BH CV LOWER VASCULAR RIGHT PERONEAL COMPRESS: NORMAL
BH CV LOWER VASCULAR RIGHT POPLITEAL AUGMENT: NORMAL
BH CV LOWER VASCULAR RIGHT POPLITEAL COMPETENT: NORMAL
BH CV LOWER VASCULAR RIGHT POPLITEAL COMPRESS: NORMAL
BH CV LOWER VASCULAR RIGHT POPLITEAL PHASIC: NORMAL
BH CV LOWER VASCULAR RIGHT POPLITEAL SPONT: NORMAL
BH CV LOWER VASCULAR RIGHT POSTERIOR TIBIAL COMPRESS: NORMAL
BH CV LOWER VASCULAR RIGHT PROXIMAL FEMORAL COMPRESS: NORMAL
BH CV LOWER VASCULAR RIGHT SAPHENOFEMORAL JUNCTION AUGMENT: NORMAL
BH CV LOWER VASCULAR RIGHT SAPHENOFEMORAL JUNCTION COMPETENT: NORMAL
BH CV LOWER VASCULAR RIGHT SAPHENOFEMORAL JUNCTION COMPRESS: NORMAL
BH CV LOWER VASCULAR RIGHT SAPHENOFEMORAL JUNCTION PHASIC: NORMAL
BH CV LOWER VASCULAR RIGHT SAPHENOFEMORAL JUNCTION SPONT: NORMAL

## 2020-01-21 PROCEDURE — 73564 X-RAY EXAM KNEE 4 OR MORE: CPT

## 2020-01-21 PROCEDURE — 99282 EMERGENCY DEPT VISIT SF MDM: CPT

## 2020-01-21 PROCEDURE — 93971 EXTREMITY STUDY: CPT

## 2020-01-21 RX ORDER — DICLOFENAC SODIUM 75 MG/1
75 TABLET, DELAYED RELEASE ORAL 2 TIMES DAILY PRN
Qty: 20 TABLET | Refills: 0 | Status: SHIPPED | OUTPATIENT
Start: 2020-01-21

## 2020-01-29 ENCOUNTER — OFFICE VISIT (OUTPATIENT)
Dept: ORTHOPEDIC SURGERY | Facility: CLINIC | Age: 70
End: 2020-01-29

## 2020-01-29 VITALS
BODY MASS INDEX: 42.42 KG/M2 | SYSTOLIC BLOOD PRESSURE: 154 MMHG | HEART RATE: 72 BPM | HEIGHT: 71 IN | WEIGHT: 303 LBS | DIASTOLIC BLOOD PRESSURE: 101 MMHG

## 2020-01-29 DIAGNOSIS — M17.0 BILATERAL PRIMARY OSTEOARTHRITIS OF KNEE: Primary | ICD-10-CM

## 2020-01-29 PROCEDURE — 99203 OFFICE O/P NEW LOW 30 MIN: CPT | Performed by: ORTHOPAEDIC SURGERY

## 2020-01-29 RX ORDER — INDOMETHACIN 50 MG/1
50 CAPSULE ORAL 3 TIMES DAILY PRN
COMMUNITY

## 2020-01-29 NOTE — PROGRESS NOTES
"     Patient ID: Jr Mayer is a 69 y.o. male.    Chief Complaint:    Chief Complaint   Patient presents with   • Right Knee - Consult       HPI:  Jr is a 69-year-old gentleman here with bilateral right greater than left knee pain.  Symptoms have gradually worsened over the last couple years.  About a month ago he had increasing pain over the back of the knee and went to the emergency room where ultrasound was negative for clot.  With some ibuprofen the pain in the back of the knee is resolved but he has activity related knee pain in both knees mainly in the front of the knee that is worse after prolonged walking.  Pain is sharp and a 6/10.  He has a history of GI bleeding so likes to avoid oral anti-inflammatories.  Past Medical History:   Diagnosis Date   • Diabetes mellitus (CMS/HCC)    • Gallstones    • Gout    • Hyperlipidemia    • Hypertension    • Kidney stone        Past Surgical History:   Procedure Laterality Date   • CHOLECYSTECTOMY  08/2019   • HERNIA REPAIR     • KIDNEY STONE SURGERY         History reviewed. No pertinent family history.       Social History     Occupational History   • Not on file   Tobacco Use   • Smoking status: Never Smoker   • Smokeless tobacco: Never Used   Substance and Sexual Activity   • Alcohol use: Yes     Alcohol/week: 1.0 standard drinks     Types: 1 Shots of liquor per week   • Drug use: No   • Sexual activity: Never      Review of Systems   Cardiovascular: Negative for chest pain.   Musculoskeletal: Positive for arthralgias.       Objective:    BP (!) 154/101   Pulse 72   Ht 180.3 cm (71\")   Wt (!) 137 kg (303 lb)   BMI 42.26 kg/m²     Physical Examination:  He is a pleasant male in no distress. He is alert and oriented x3 and appears his stated age.  Both knees demonstrate no scars and no atrophy with a mild effusion on the right and none on the left.  He has medial joint line tenderness on both knees with range of motion 0 to 150 degrees and no instability " and a positive medial Breanne.Sensory and motor exam are intact all distributions. Dorsalis pedis and posterior tibialis pulses are palpable and capillary refill is less than two seconds to all digits    Imaging:  X-rays demonstrate moderate degenerative joint disease    Assessment:  Moderate degenerative joint disease both knees    Plan:  Treatment options discussed, we will begin with Visco supplementation.  Possibility of additional steroid injections were discussed as well.          Disclaimer: Please note that areas of this note were completed with computer voice recognition software.  Quite often unanticipated grammatical, syntax, homophones, and other interpretive errors are inadvertently transcribed by the computer software. Please excuse any errors that have escaped final proofreading.

## 2020-03-10 ENCOUNTER — TELEPHONE (OUTPATIENT)
Dept: ORTHOPEDIC SURGERY | Facility: CLINIC | Age: 70
End: 2020-03-10

## 2020-03-19 ENCOUNTER — OFFICE VISIT (OUTPATIENT)
Dept: ORTHOPEDIC SURGERY | Facility: CLINIC | Age: 70
End: 2020-03-19

## 2020-03-19 VITALS
HEIGHT: 71 IN | SYSTOLIC BLOOD PRESSURE: 145 MMHG | DIASTOLIC BLOOD PRESSURE: 87 MMHG | TEMPERATURE: 98.2 F | WEIGHT: 303 LBS | BODY MASS INDEX: 42.42 KG/M2 | HEART RATE: 81 BPM

## 2020-03-19 DIAGNOSIS — M17.0 BILATERAL PRIMARY OSTEOARTHRITIS OF KNEE: Primary | ICD-10-CM

## 2020-03-19 PROCEDURE — 20610 DRAIN/INJ JOINT/BURSA W/O US: CPT | Performed by: ORTHOPAEDIC SURGERY

## 2020-03-19 NOTE — PROGRESS NOTES
"     Patient ID: Jr Mayer is a 69 y.o. male.    Bilateral knee pain, here for Visco 1    Objective:    /87   Pulse 81   Temp 98.2 °F (36.8 °C)   Ht 180.3 cm (71\")   Wt (!) 137 kg (303 lb)   BMI 42.26 kg/m²     Physical Examination:  Both knees demonstrate no redness trace effusion      Imaging:      Assessment:  Bilateral knee degenerative joint disease    Plan:  Risks and benefits of the injection were discussed. Under sterile technique and written consent I injected one syringe of Orthovisc into each knee. It was well tolerated. Postinjection instructions were given  "

## 2020-03-26 ENCOUNTER — OFFICE VISIT (OUTPATIENT)
Dept: ORTHOPEDIC SURGERY | Facility: CLINIC | Age: 70
End: 2020-03-26

## 2020-03-26 VITALS
WEIGHT: 303 LBS | TEMPERATURE: 98.1 F | HEART RATE: 79 BPM | SYSTOLIC BLOOD PRESSURE: 134 MMHG | DIASTOLIC BLOOD PRESSURE: 80 MMHG | BODY MASS INDEX: 42.42 KG/M2 | HEIGHT: 71 IN

## 2020-03-26 DIAGNOSIS — M17.0 BILATERAL PRIMARY OSTEOARTHRITIS OF KNEE: Primary | ICD-10-CM

## 2020-03-26 PROCEDURE — 20610 DRAIN/INJ JOINT/BURSA W/O US: CPT | Performed by: ORTHOPAEDIC SURGERY

## 2020-03-26 NOTE — PROGRESS NOTES
Patient ID: Jr Mayer is a 69 y.o. male.    Bilateral knee pain  Here for Visco 2    Objective:    There were no vitals taken for this visit.    Physical Examination:  Both knees demonstrate no redness mild effusions      Imaging:      Assessment:  Bilateral knee degenerative joint disease    Plan:  Risks and benefits of the injection were discussed. Under sterile technique and written consent I injected one syringe of Orthovisc into each knee. It was well tolerated. Postinjection instructions were given

## 2020-03-27 ENCOUNTER — TELEPHONE (OUTPATIENT)
Dept: ORTHOPEDIC SURGERY | Facility: CLINIC | Age: 70
End: 2020-03-27

## 2020-03-30 ENCOUNTER — OFFICE VISIT (OUTPATIENT)
Dept: ORTHOPEDIC SURGERY | Facility: CLINIC | Age: 70
End: 2020-03-30

## 2020-03-30 VITALS
WEIGHT: 303 LBS | DIASTOLIC BLOOD PRESSURE: 83 MMHG | HEART RATE: 98 BPM | HEIGHT: 71 IN | BODY MASS INDEX: 42.42 KG/M2 | SYSTOLIC BLOOD PRESSURE: 131 MMHG

## 2020-03-30 DIAGNOSIS — M17.0 BILATERAL PRIMARY OSTEOARTHRITIS OF KNEE: Primary | ICD-10-CM

## 2020-03-30 PROCEDURE — 20610 DRAIN/INJ JOINT/BURSA W/O US: CPT | Performed by: ORTHOPAEDIC SURGERY

## 2020-03-30 NOTE — PROGRESS NOTES
"     Patient ID: Jr Mayer is a 69 y.o. male.  Bilateral knee pain  Here for Visco 3      Objective:    There were no vitals taken for this visit.    Physical Examination:  Both knees demonstrate no redness trace effusions      Imaging:      Assessment:  Bilateral knee degenerative joint disease  Plan:  Risks and benefits of the injection were discussed. Under sterile technique and written consent I injected one syringe of Orthovisc into each knee. It was well tolerated. Postinjection instructions were given     Patient ID: Jr Mayer is a 69 y.o. male.        Objective:    /83   Pulse 98   Ht 180.3 cm (71\")   Wt (!) 137 kg (303 lb)   BMI 42.26 kg/m²     Physical Examination:        Imaging:      Assessment:      Plan:    "

## 2023-09-25 ENCOUNTER — OFFICE VISIT (OUTPATIENT)
Dept: ORTHOPEDIC SURGERY | Facility: CLINIC | Age: 73
End: 2023-09-25

## 2023-09-25 VITALS — HEIGHT: 71 IN | BODY MASS INDEX: 42.95 KG/M2 | WEIGHT: 306.8 LBS | HEART RATE: 87 BPM

## 2023-09-25 DIAGNOSIS — M17.0 BILATERAL PRIMARY OSTEOARTHRITIS OF KNEE: Primary | ICD-10-CM

## 2023-09-25 PROCEDURE — 99203 OFFICE O/P NEW LOW 30 MIN: CPT | Performed by: ORTHOPAEDIC SURGERY

## 2023-09-25 RX ORDER — HYDROCODONE BITARTRATE AND ACETAMINOPHEN 7.5; 325 MG/1; MG/1
TABLET ORAL
COMMUNITY
Start: 2023-07-11

## 2023-09-25 RX ORDER — TAMSULOSIN HYDROCHLORIDE 0.4 MG/1
CAPSULE ORAL
COMMUNITY

## 2023-09-25 NOTE — PROGRESS NOTES
"     Patient ID: Jr Mayer is a 72 y.o. male.    Chief Complaint:    Chief Complaint   Patient presents with    Right Knee - Initial Evaluation     Pain 3-4    Left Knee - Initial Evaluation, Pain     Pain 5-6        HPI:  This is a 72-year-old gentleman here with longstanding bilateral knee pain secondary to arthritis he has symptoms that are mainly worse in the morning and with prolonged walking.  Actually feels a little bit better during the day while walking he takes Tylenol and occasional hydrocodone he had viscosupplementation with good results about 3 years ago  Past Medical History:   Diagnosis Date    Diabetes mellitus     Gallstones     Gout     Hyperlipidemia     Hypertension     Kidney stone        Past Surgical History:   Procedure Laterality Date    CHOLECYSTECTOMY  08/2019    HERNIA REPAIR      KIDNEY STONE SURGERY         History reviewed. No pertinent family history.       Social History     Occupational History    Not on file   Tobacco Use    Smoking status: Never    Smokeless tobacco: Never   Vaping Use    Vaping Use: Never used   Substance and Sexual Activity    Alcohol use: Yes     Alcohol/week: 1.0 standard drink     Types: 1 Shots of liquor per week    Drug use: No    Sexual activity: Never      Review of Systems   Cardiovascular:  Negative for chest pain.   Musculoskeletal:  Positive for arthralgias.     Objective:    Pulse 87   Ht 180.3 cm (71\")   Wt (!) 139 kg (306 lb 12.8 oz)   BMI 42.79 kg/m²     Physical Examination:    Both knees demonstrate intact skin there is a moderate effusion on the left mild effusion on the right no redness or warmth mild varus alignment with mild to moderate medial joint line tenderness knee range of motion 2 to 110 degrees bilateral  Sensory and motor exam are intact in all distributions. Dorsalis pedis and posterior tibialis pulses are palpable and capillary refill is less than two seconds to all digits.  Imaging:  Recent x-rays are reviewed " demonstrate end-stage degenerative joint disease both knees    Assessment:  Bilateral knee degenerative joint disease    Plan:  Options discussed he would like to continue conservative treatment I recommend repeat viscosupplementation      Procedures         Disclaimer: Part of this note may be an electronic transcription/translation of spoken language to printed text using the Dragon Dictation System

## 2023-09-27 ENCOUNTER — PATIENT ROUNDING (BHMG ONLY) (OUTPATIENT)
Dept: ORTHOPEDIC SURGERY | Facility: CLINIC | Age: 73
End: 2023-09-27
Payer: MEDICARE

## 2023-09-27 NOTE — PROGRESS NOTES
A My-Chart message has been sent to the patient for PATIENT ROUNDING with [facility]

## 2023-10-19 ENCOUNTER — OFFICE VISIT (OUTPATIENT)
Dept: ORTHOPEDIC SURGERY | Facility: CLINIC | Age: 73
End: 2023-10-19
Payer: MEDICARE

## 2023-10-19 VITALS — HEIGHT: 71 IN | WEIGHT: 306 LBS | BODY MASS INDEX: 42.84 KG/M2 | HEART RATE: 86 BPM

## 2023-10-19 DIAGNOSIS — M17.0 BILATERAL PRIMARY OSTEOARTHRITIS OF KNEE: Primary | ICD-10-CM

## 2023-10-19 NOTE — PROGRESS NOTES
Patient ID: Jr Mayer is a 72 y.o. male.    Bilateral knee pain, here for Visco  Review of Systems:        Objective:    There were no vitals taken for this visit.    Physical Examination:   Both knees demonstrate intact skin there is a moderate effusion on the left mild effusion on the right no redness or warmth mild varus alignment with mild to moderate medial joint line tenderness knee range of motion 2 to 110 degrees bilateral        Imaging:       Assessment:    Bilateral knee degenerative joint disease     Plan:         Large Joint Arthrocentesis: R knee  Date/Time: 10/19/2023 10:36 AM  Consent given by: patient  Timeout: Immediately prior to procedure a time out was called to verify the correct patient, procedure, equipment, support staff and site/side marked as required   Supporting Documentation  Indications: pain   Procedure Details  Location: knee - R knee  Preparation: Patient was prepped and draped in the usual sterile fashion  Needle size: 22 G  Medications administered: 88 mg Hyaluronan 88 MG/4ML  Patient tolerance: patient tolerated the procedure well with no immediate complications      Large Joint Arthrocentesis: L knee  Date/Time: 10/19/2023 10:36 AM  Consent given by: patient  Timeout: Immediately prior to procedure a time out was called to verify the correct patient, procedure, equipment, support staff and site/side marked as required   Supporting Documentation  Indications: pain   Procedure Details  Location: knee - L knee  Preparation: Patient was prepped and draped in the usual sterile fashion  Needle size: 22 G  Medications administered: 88 mg Hyaluronan 88 MG/4ML  Patient tolerance: patient tolerated the procedure well with no immediate complications            Disclaimer: Part of this note may be an electronic transcription/translation of spoken language to printed text using the Dragon Dictation System